# Patient Record
Sex: MALE | Race: WHITE | NOT HISPANIC OR LATINO
[De-identification: names, ages, dates, MRNs, and addresses within clinical notes are randomized per-mention and may not be internally consistent; named-entity substitution may affect disease eponyms.]

---

## 2021-04-20 ENCOUNTER — TRANSCRIPTION ENCOUNTER (OUTPATIENT)
Age: 61
End: 2021-04-20

## 2021-05-20 ENCOUNTER — TRANSCRIPTION ENCOUNTER (OUTPATIENT)
Age: 61
End: 2021-05-20

## 2021-08-03 ENCOUNTER — TRANSCRIPTION ENCOUNTER (OUTPATIENT)
Age: 61
End: 2021-08-03

## 2023-03-11 ENCOUNTER — NON-APPOINTMENT (OUTPATIENT)
Age: 63
End: 2023-03-11

## 2024-04-29 PROBLEM — Z00.00 ENCOUNTER FOR PREVENTIVE HEALTH EXAMINATION: Status: ACTIVE | Noted: 2024-04-29

## 2024-05-07 ENCOUNTER — APPOINTMENT (OUTPATIENT)
Dept: ORTHOPEDIC SURGERY | Facility: CLINIC | Age: 64
End: 2024-05-07

## 2024-06-05 ENCOUNTER — APPOINTMENT (OUTPATIENT)
Dept: ORTHOPEDIC SURGERY | Facility: CLINIC | Age: 64
End: 2024-06-05
Payer: COMMERCIAL

## 2024-06-05 DIAGNOSIS — M23.92 UNSPECIFIED INTERNAL DERANGEMENT OF LEFT KNEE: ICD-10-CM

## 2024-06-05 DIAGNOSIS — M17.12 UNILATERAL PRIMARY OSTEOARTHRITIS, LEFT KNEE: ICD-10-CM

## 2024-06-05 DIAGNOSIS — N28.9 DISORDER OF KIDNEY AND URETER, UNSPECIFIED: ICD-10-CM

## 2024-06-05 DIAGNOSIS — R06.81 GASTRO-ESOPHAGEAL REFLUX DISEASE W/OUT ESOPHAGITIS: ICD-10-CM

## 2024-06-05 DIAGNOSIS — G47.30 SLEEP APNEA, UNSPECIFIED: ICD-10-CM

## 2024-06-05 DIAGNOSIS — K21.9 GASTRO-ESOPHAGEAL REFLUX DISEASE W/OUT ESOPHAGITIS: ICD-10-CM

## 2024-06-05 DIAGNOSIS — Z78.9 OTHER SPECIFIED HEALTH STATUS: ICD-10-CM

## 2024-06-05 DIAGNOSIS — Z95.2 PRESENCE OF PROSTHETIC HEART VALVE: ICD-10-CM

## 2024-06-05 DIAGNOSIS — E78.00 PURE HYPERCHOLESTEROLEMIA, UNSPECIFIED: ICD-10-CM

## 2024-06-05 PROCEDURE — 73562 X-RAY EXAM OF KNEE 3: CPT | Mod: LT

## 2024-06-05 PROCEDURE — 99204 OFFICE O/P NEW MOD 45 MIN: CPT

## 2024-06-05 RX ORDER — LABETALOL HYDROCHLORIDE 300 MG/1
TABLET, FILM COATED ORAL
Refills: 0 | Status: ACTIVE | COMMUNITY

## 2024-06-05 RX ORDER — ROSUVASTATIN CALCIUM 5 MG/1
TABLET, FILM COATED ORAL
Refills: 0 | Status: ACTIVE | COMMUNITY

## 2024-06-05 RX ORDER — ASPIRIN 325 MG/1
325 TABLET, FILM COATED ORAL
Refills: 0 | Status: ACTIVE | COMMUNITY

## 2024-06-05 NOTE — PHYSICAL EXAM
[de-identified] : Constitutional: The patient appears well developed, well nourished. Examination of patients ability to communicate functionally was normal.       Neurologic: Coordination is normal. Alert and oriented to time, place and person. No evidence of mood disorder, calm affect.         LEFT   KNEE: Inspection of the knee is as follows: mild effusion. no ecchymosis, no streaking, no erythema, no atrophy, no deformities of the quad tendon and no deformities of patellar tendon.       Palpation of the knee is as follows: medial joint line tenderness. no obvious defects, no palpable masses, no increased warmth and no crepitus.       Knee Range of Motion is as follows in degrees:       Extension: 0    Flexion: 125      Strength examination of the knee is as follows:       Quadriceps strength is 5/5   Hamstring strength is 5/5       Ligament Stability and Special Test:  positive McMurrays test. ligamentously stable, negative anterior draw, negative Lachman test, negative posterior draw and no varus or valgus instability. patella tracks well and able to do active straight leg raise without an extensor lag.       Neurological examination of the knee is as follows: light touch is intact throughout.       Gait and function is as follows: non-antalgic gait. [Left] : left knee [AP] : anteroposterior [Lateral] : lateral [Biron] : skyline [[email or web address]] :  medial joint space narrowing.

## 2024-06-05 NOTE — HISTORY OF PRESENT ILLNESS
[de-identified] : Patient presents with left knee pain. Patient reports that 6 weeks ago while performing a yoga pose, he felt some pain at the medial aspect of the knee. Patient also reports some pain at the lateral aspect of the knee. Patient reports that the knee has improved over the last month, but still present and bothersome. Patient reports he has continued yoga daily. Patient denies prior treatment and imaging at this time of this left knee. Patient reports also that with extended periods of sitting, he notes a numbness and tingling along with weakness in the posterior thigh region. Has prior back history as well, was seeing Dr. Khan in 2022. Patient has medial joint line pain and lateral proximal calf pain. Patient denies clicking of the knee. X-ray shows

## 2024-06-05 NOTE — DISCUSSION/SUMMARY
[de-identified] : Extensive discussion of the options was had with the patient. This discussion included both surgical and nonsurgical options. Options including but not limited to cortisone injection,  oral anti-inflammatories, MRI were discussed with the patient. We also discussed the option of observation, allowing the patient to continue rest, ice, prescription drug NSAIDs and following up if the condition does not improve. Time was taken to go over any questions the patient had in regard to any of the treatment plans described. Patient has medial joint line pain and lateral proximal calf pain. Patient denies clicking of the knee. X-ray shows medial joint space narrowing.  Patient was given Rx for MRI of the left knee to further evaluate for any ligamentous, muscle and cartilaginous injury that is suspected due to physical examination and patients description of pain, clicking, and feelings of instability and/or weakness. Patient was instructed to f/u after imaging for review.   The following information has been documented by Brigitte Vu acting as a scribe. Dr. Palumbo Attestation The documentation recorded by the scribe, in my presence, accurately reflects the service I, Dr. Palumbo, personally performed, and the decisions made by me with my edits as appropriate.

## 2024-06-14 ENCOUNTER — APPOINTMENT (OUTPATIENT)
Dept: MRI IMAGING | Facility: CLINIC | Age: 64
End: 2024-06-14
Payer: COMMERCIAL

## 2024-06-14 PROCEDURE — 73721 MRI JNT OF LWR EXTRE W/O DYE: CPT | Mod: LT

## 2024-08-19 ENCOUNTER — APPOINTMENT (OUTPATIENT)
Dept: ORTHOPEDIC SURGERY | Facility: CLINIC | Age: 64
End: 2024-08-19

## 2025-05-05 ENCOUNTER — TRANSCRIPTION ENCOUNTER (OUTPATIENT)
Age: 65
End: 2025-05-05

## 2025-09-19 ENCOUNTER — NON-APPOINTMENT (OUTPATIENT)
Age: 65
End: 2025-09-19